# Patient Record
Sex: MALE
[De-identification: names, ages, dates, MRNs, and addresses within clinical notes are randomized per-mention and may not be internally consistent; named-entity substitution may affect disease eponyms.]

---

## 2022-09-03 ENCOUNTER — NURSE TRIAGE (OUTPATIENT)
Dept: OTHER | Facility: CLINIC | Age: 22
End: 2022-09-03

## 2022-09-03 NOTE — TELEPHONE ENCOUNTER
Subjective: Caller states \"Last night there was a bat in the house, it was in my room but It flew out of the front door. My finger was bleeding when I got out of the door. \"     Current Symptoms: scratch on right thumb possible bat scratch or bite. Onset: Last night    Associated Symptoms: NA    Pain Severity: 1    Temperature: Denies     What has been tried: Denies    LMP: NA Pregnant: NA    Recommended disposition: Go to ED Now    Care advice provided, patient verbalizes understanding; denies any other questions or concerns; instructed to call back for any new or worsening symptoms. Patient/caller agrees to proceed to nearest Emergency Department    This triage is a result of a call to [address] [place] [address]. Please do not respond to the triage nurse through this encounter. Any subsequent communication should be directly with the patient. Reason for Disposition   [1] Any break in skin from BITE (e.g., cut, puncture or scratch) AND[2] WILD animal at risk for RABIES (e.g., bat, raccoon, poole, skunk, coyote, other carnivores)    Protocols used:  Animal Bite-ADULT-